# Patient Record
Sex: FEMALE | Race: WHITE | NOT HISPANIC OR LATINO | Employment: FULL TIME
[De-identification: names, ages, dates, MRNs, and addresses within clinical notes are randomized per-mention and may not be internally consistent; named-entity substitution may affect disease eponyms.]

---

## 2017-05-26 ENCOUNTER — HEALTH MAINTENANCE LETTER (OUTPATIENT)
Age: 52
End: 2017-05-26

## 2022-09-03 ENCOUNTER — HOSPITAL ENCOUNTER (EMERGENCY)
Facility: CLINIC | Age: 57
Discharge: HOME | End: 2022-09-03
Attending: FAMILY MEDICINE | Admitting: FAMILY MEDICINE
Payer: COMMERCIAL

## 2022-09-03 ENCOUNTER — APPOINTMENT (OUTPATIENT)
Dept: GENERAL RADIOLOGY | Facility: CLINIC | Age: 57
End: 2022-09-03
Attending: FAMILY MEDICINE
Payer: COMMERCIAL

## 2022-09-03 VITALS
SYSTOLIC BLOOD PRESSURE: 169 MMHG | HEART RATE: 81 BPM | DIASTOLIC BLOOD PRESSURE: 104 MMHG | TEMPERATURE: 97.5 F | OXYGEN SATURATION: 96 % | RESPIRATION RATE: 18 BRPM

## 2022-09-03 DIAGNOSIS — S63.611A UNSPECIFIED SPRAIN OF LEFT INDEX FINGER, INITIAL ENCOUNTER: ICD-10-CM

## 2022-09-03 PROCEDURE — 99282 EMERGENCY DEPT VISIT SF MDM: CPT | Performed by: FAMILY MEDICINE

## 2022-09-03 PROCEDURE — 73140 X-RAY EXAM OF FINGER(S): CPT | Mod: LT

## 2022-09-03 PROCEDURE — 99283 EMERGENCY DEPT VISIT LOW MDM: CPT | Performed by: FAMILY MEDICINE

## 2022-09-03 ASSESSMENT — ENCOUNTER SYMPTOMS: WOUND: 0

## 2022-09-03 ASSESSMENT — ACTIVITIES OF DAILY LIVING (ADL): ADLS_ACUITY_SCORE: 35

## 2022-09-03 NOTE — DISCHARGE INSTRUCTIONS
Buddy tape the index finger to the middle finger for support.  Ice liberally.  Tylenol/ibuprofen as needed for discomfort.  Recheck with orthopedics next week.  The swelling and bruising will take many weeks to resolve.  It was nice visiting with you this morning.  I hope this heals up quickly for you.    Thank you for choosing [facility]. We appreciate the opportunity to meet your urgent medical needs. Please let us know if we could have done anything to make your stay more satisfying.    After discharge, please closely monitor for any new or worsening symptoms. Return to the Emergency Department if you develop any acute worsening signs or symptoms.    If you had lab work, cultures or imaging studies done during your stay, the final results may still be pending. We will call you if your plan of care needs to change. However, if you are not improving as expected, please follow up with your primary care provider or clinic.     Start any prescription medications that were prescribed to you and take them as directed.     Please see additional handouts that may be pertinent to your condition.

## 2022-09-03 NOTE — ED PROVIDER NOTES
History     Chief Complaint   Patient presents with     Hand Injury     HPI  Khushbu Kilgore is a 56 year old female who presents to the ED this morning after injuring her left index finger.  She was doing a good deep by taking a friend to the airport.  She was getting back into her car when she tripped on her own feet and fell onto her left hand jamming her left index finger.  She states that it was crooked distally and she pulled it straight again.  Unfortunately, she is left-handed dominant.  She denies any other injuries.  She immediately applied ice.  It is swollen and starting to bruise.    Allergies:  Allergies   Allergen Reactions     No Known Drug Allergies        Problem List:    Patient Active Problem List    Diagnosis Date Noted     Depressive disorder, not elsewhere classified 12/08/2004     Priority: Medium     Headache 03/05/2004     Priority: Medium     Problem list name updated by automated process. Provider to review          Past Medical History:    Past Medical History:   Diagnosis Date     HEADACHE        Past Surgical History:    Past Surgical History:   Procedure Laterality Date     REMOVAL GALLBLADDER      Cholecystectomy     REMOVAL OF TONSILS,<13 Y/O      Tonsils; not sure of age     RX ECTOP PREG BY SCOPE,RMV TUBE/OVRY  12/23/2003    Laparoscopic right salpingectomy and removal of ectopic pregnancy.  D&C       Family History:    Family History   Problem Relation Age of Onset     Hypertension Father      Diabetes Mother      Diabetes Maternal Grandfather      Lipids Mother      Cancer Father         lung     Breast Cancer Maternal Aunt        Social History:  Marital Status:   [4]  Social History     Tobacco Use     Smoking status: Current Every Day Smoker     Packs/day: 1.00     Years: 23.00     Pack years: 23.00     Types: Cigarettes   Substance Use Topics     Alcohol use: Yes     Comment: rarly        Medications:    SODIUM SULFACETAMIDE 10 % OP OINT  ZITHROMAX Z-DOMINGA 250 MG  OR CAPS          Review of Systems   Skin: Negative for wound.       Physical Exam   BP: (!) 169/104  Pulse: 81  Temp: 97.5  F (36.4  C)  Resp: 18  SpO2: 96 %      Physical Exam  Constitutional:       General: She is not in acute distress.     Appearance: Normal appearance.   Pulmonary:      Effort: Pulmonary effort is normal. No respiratory distress.   Musculoskeletal:      Left hand: Swelling and tenderness present. No deformity. Decreased range of motion.      Comments: Left index finger is moderately swollen and ecchymotic especially around the PIP joint.  She has limited range of motion and tenderness.  No deformity but she states it was crooked and she strained it out.  No tenderness down into the hand.   Neurological:      Mental Status: She is alert.         ED Course                 Procedures              Critical Care time:  none               Results for orders placed or performed during the hospital encounter of 09/03/22 (from the past 24 hour(s))   Fingers XR, 2-3 views, left    Narrative    EXAM: XR FINGER LEFT G/E 2 VIEWS  LOCATION: [facility]  DATE/TIME: 09/03/2022, 5:43 AM    INDICATION: Pain after fall, was crooked distally, patient pulled it straight.  COMPARISON: None.      Impression    IMPRESSION: There is a small ossific fragment which appears well-corticated at the volar aspect of the IP joint of the left second finger which is favored to be chronic in nature. Tiny acute avulsion fracture fragment accounting for this appearance felt   to be less likely. Clinical correlation. Mild soft tissue swelling about the left second finger. Remainder unremarkable.           Medications - No data to display    Assessments & Plan (with Medical Decision Making)  56-year-old left-handed dominant female tripped and fell injuring her left index finger.  She states it was crooked distally and she pulled it straight.  Is now swollen and ecchymotic with decreased range of motion  and tenderness distally.  Left index finger x-ray shows no evidence of acute fracture or dislocation.  The index finger was donell taped to the middle finger.  No evidence of any mallet or boutonniere deformity.  Verbal and written discharge instructions given.  She is comfortable with this plan.     I have reviewed the nursing notes.    I have reviewed the findings, diagnosis, plan and need for follow up with the patient.       New Prescriptions    No medications on file       Final diagnoses:   Unspecified sprain of left index finger, initial encounter - possible PIP dislocation with self reduction       9/3/2022   [facility] EMERGENCY DEPT     Elijah Benitez MD  09/03/22 0614

## 2022-09-03 NOTE — ED TRIAGE NOTES
Patient presents with left pointer finger pain. Some bruising around first knuckle noted. States she was taking some ice out of her backseat when she tripped and landed on her hand. Is able to move and feel the finger, but is unable to fully straighten or bend it.     Triage Assessment     Row Name 09/03/22 0436       Triage Assessment (Adult)    Airway WDL WDL       Respiratory WDL    Respiratory WDL WDL       Skin Circulation/Temperature WDL    Skin Circulation/Temperature WDL WDL       Cardiac WDL    Cardiac WDL WDL       Peripheral/Neurovascular WDL    Peripheral Neurovascular WDL WDL       Cognitive/Neuro/Behavioral WDL    Cognitive/Neuro/Behavioral WDL WDL